# Patient Record
Sex: MALE | Race: OTHER | URBAN - METROPOLITAN AREA
[De-identification: names, ages, dates, MRNs, and addresses within clinical notes are randomized per-mention and may not be internally consistent; named-entity substitution may affect disease eponyms.]

---

## 2017-01-01 ENCOUNTER — EMERGENCY (EMERGENCY)
Facility: HOSPITAL | Age: 27
LOS: 0 days | Discharge: HOME | End: 2017-01-01
Admitting: INTERNAL MEDICINE

## 2017-06-27 DIAGNOSIS — H93.8X1 OTHER SPECIFIED DISORDERS OF RIGHT EAR: ICD-10-CM

## 2017-06-27 DIAGNOSIS — R09.81 NASAL CONGESTION: ICD-10-CM

## 2017-06-27 DIAGNOSIS — J06.9 ACUTE UPPER RESPIRATORY INFECTION, UNSPECIFIED: ICD-10-CM

## 2018-06-08 ENCOUNTER — EMERGENCY (EMERGENCY)
Facility: HOSPITAL | Age: 28
LOS: 0 days | Discharge: HOME | End: 2018-06-08
Attending: EMERGENCY MEDICINE | Admitting: EMERGENCY MEDICINE

## 2018-06-08 VITALS
SYSTOLIC BLOOD PRESSURE: 125 MMHG | OXYGEN SATURATION: 98 % | RESPIRATION RATE: 20 BRPM | DIASTOLIC BLOOD PRESSURE: 86 MMHG | HEART RATE: 116 BPM | TEMPERATURE: 101 F | WEIGHT: 250 LBS | HEIGHT: 69 IN

## 2018-06-08 VITALS
SYSTOLIC BLOOD PRESSURE: 106 MMHG | OXYGEN SATURATION: 98 % | TEMPERATURE: 100 F | DIASTOLIC BLOOD PRESSURE: 76 MMHG | RESPIRATION RATE: 18 BRPM | HEART RATE: 102 BPM

## 2018-06-08 DIAGNOSIS — R51 HEADACHE: ICD-10-CM

## 2018-06-08 DIAGNOSIS — B34.9 VIRAL INFECTION, UNSPECIFIED: ICD-10-CM

## 2018-06-08 DIAGNOSIS — M79.1 MYALGIA: ICD-10-CM

## 2018-06-08 DIAGNOSIS — R50.9 FEVER, UNSPECIFIED: ICD-10-CM

## 2018-06-08 LAB
ALBUMIN SERPL ELPH-MCNC: 4.4 G/DL — SIGNIFICANT CHANGE UP (ref 3.5–5.2)
ALP SERPL-CCNC: 52 U/L — SIGNIFICANT CHANGE UP (ref 30–115)
ALT FLD-CCNC: 32 U/L — SIGNIFICANT CHANGE UP (ref 0–41)
ANION GAP SERPL CALC-SCNC: 17 MMOL/L — HIGH (ref 7–14)
APPEARANCE UR: CLEAR — SIGNIFICANT CHANGE UP
AST SERPL-CCNC: 28 U/L — SIGNIFICANT CHANGE UP (ref 0–41)
BASOPHILS # BLD AUTO: 0.01 K/UL — SIGNIFICANT CHANGE UP (ref 0–0.2)
BASOPHILS NFR BLD AUTO: 0.2 % — SIGNIFICANT CHANGE UP (ref 0–1)
BILIRUB SERPL-MCNC: 0.5 MG/DL — SIGNIFICANT CHANGE UP (ref 0.2–1.2)
BILIRUB UR-MCNC: NEGATIVE — SIGNIFICANT CHANGE UP
BUN SERPL-MCNC: 11 MG/DL — SIGNIFICANT CHANGE UP (ref 10–20)
CALCIUM SERPL-MCNC: 9.1 MG/DL — SIGNIFICANT CHANGE UP (ref 8.5–10.1)
CHLORIDE SERPL-SCNC: 96 MMOL/L — LOW (ref 98–110)
CO2 SERPL-SCNC: 24 MMOL/L — SIGNIFICANT CHANGE UP (ref 17–32)
COLOR SPEC: YELLOW — SIGNIFICANT CHANGE UP
CREAT SERPL-MCNC: 0.7 MG/DL — SIGNIFICANT CHANGE UP (ref 0.7–1.5)
DIFF PNL FLD: NEGATIVE — SIGNIFICANT CHANGE UP
EOSINOPHIL # BLD AUTO: 0.01 K/UL — SIGNIFICANT CHANGE UP (ref 0–0.7)
EOSINOPHIL NFR BLD AUTO: 0.2 % — SIGNIFICANT CHANGE UP (ref 0–8)
GLUCOSE SERPL-MCNC: 102 MG/DL — HIGH (ref 70–99)
GLUCOSE UR QL: NEGATIVE MG/DL — SIGNIFICANT CHANGE UP
HCT VFR BLD CALC: 47.1 % — SIGNIFICANT CHANGE UP (ref 42–52)
HGB BLD-MCNC: 15.8 G/DL — SIGNIFICANT CHANGE UP (ref 14–18)
IMM GRANULOCYTES NFR BLD AUTO: 0.4 % — HIGH (ref 0.1–0.3)
KETONES UR-MCNC: NEGATIVE — SIGNIFICANT CHANGE UP
LEUKOCYTE ESTERASE UR-ACNC: NEGATIVE — SIGNIFICANT CHANGE UP
LYMPHOCYTES # BLD AUTO: 0.87 K/UL — LOW (ref 1.2–3.4)
LYMPHOCYTES # BLD AUTO: 15.6 % — LOW (ref 20.5–51.1)
MCHC RBC-ENTMCNC: 28.4 PG — SIGNIFICANT CHANGE UP (ref 27–31)
MCHC RBC-ENTMCNC: 33.5 G/DL — SIGNIFICANT CHANGE UP (ref 32–37)
MCV RBC AUTO: 84.6 FL — SIGNIFICANT CHANGE UP (ref 80–94)
MONOCYTES # BLD AUTO: 0.46 K/UL — SIGNIFICANT CHANGE UP (ref 0.1–0.6)
MONOCYTES NFR BLD AUTO: 8.3 % — SIGNIFICANT CHANGE UP (ref 1.7–9.3)
NEUTROPHILS # BLD AUTO: 4.2 K/UL — SIGNIFICANT CHANGE UP (ref 1.4–6.5)
NEUTROPHILS NFR BLD AUTO: 75.3 % — HIGH (ref 42.2–75.2)
NITRITE UR-MCNC: NEGATIVE — SIGNIFICANT CHANGE UP
PH UR: 6.5 — SIGNIFICANT CHANGE UP (ref 5–8)
PLATELET # BLD AUTO: 260 K/UL — SIGNIFICANT CHANGE UP (ref 130–400)
POTASSIUM SERPL-MCNC: 4.3 MMOL/L — SIGNIFICANT CHANGE UP (ref 3.5–5)
POTASSIUM SERPL-SCNC: 4.3 MMOL/L — SIGNIFICANT CHANGE UP (ref 3.5–5)
PROT SERPL-MCNC: 7.4 G/DL — SIGNIFICANT CHANGE UP (ref 6–8)
PROT UR-MCNC: (no result) MG/DL
RBC # BLD: 5.57 M/UL — SIGNIFICANT CHANGE UP (ref 4.7–6.1)
RBC # FLD: 12.1 % — SIGNIFICANT CHANGE UP (ref 11.5–14.5)
SODIUM SERPL-SCNC: 137 MMOL/L — SIGNIFICANT CHANGE UP (ref 135–146)
SP GR SPEC: 1.02 — SIGNIFICANT CHANGE UP (ref 1.01–1.03)
UROBILINOGEN FLD QL: 0.2 MG/DL — SIGNIFICANT CHANGE UP (ref 0.2–0.2)
WBC # BLD: 5.57 K/UL — SIGNIFICANT CHANGE UP (ref 4.8–10.8)
WBC # FLD AUTO: 5.57 K/UL — SIGNIFICANT CHANGE UP (ref 4.8–10.8)

## 2018-06-08 RX ORDER — KETOROLAC TROMETHAMINE 30 MG/ML
15 SYRINGE (ML) INJECTION ONCE
Qty: 0 | Refills: 0 | Status: COMPLETED | OUTPATIENT
Start: 2018-06-08 | End: 2018-06-08

## 2018-06-08 RX ORDER — ACETAMINOPHEN 500 MG
650 TABLET ORAL ONCE
Qty: 0 | Refills: 0 | Status: COMPLETED | OUTPATIENT
Start: 2018-06-08 | End: 2018-06-08

## 2018-06-08 RX ORDER — SODIUM CHLORIDE 9 MG/ML
2000 INJECTION, SOLUTION INTRAVENOUS ONCE
Qty: 0 | Refills: 0 | Status: DISCONTINUED | OUTPATIENT
Start: 2018-06-08 | End: 2018-06-08

## 2018-06-08 RX ADMIN — Medication 650 MILLIGRAM(S): at 00:30

## 2018-06-08 NOTE — ED PROVIDER NOTE - CONDITION AT DISCHARGE:
GASTROINTESTINAL - ADULT    • Minimal or absence of nausea and vomiting Progressing    • Maintains or returns to baseline bowel function Progressing    • Maintains adequate nutritional intake (undernourished) Progressing        Impaired Activities of Daily Improved

## 2018-06-08 NOTE — ED PROVIDER NOTE - OBJECTIVE STATEMENT
27 yr old male, no sig PMH, presenting with fevers, headache, sore throat, N/V x1, and myalgias that started today, denies any sick contacts or recent travel, denies any chest pain, SOB, diarrhea, abd pain, or joint pain.

## 2018-06-08 NOTE — ED PROVIDER NOTE - NS ED ROS FT
Review of Systems:  	•	CONSTITUTIONAL - + fever, no diaphoresis, no chills  	•	SKIN - no rash  	•	RESPIRATORY - no shortness of breath, no cough  	•	CARDIAC - no chest pain, no palpitations  	•	GI - no abd pain, no nausea, + vomiting, no diarrhea  	•	MUSCULOSKELETAL - +myalgia, no joint paint, no swelling, no redness  	•	NEUROLOGIC - no weakness, +headache, no paresthesias, no LOC

## 2018-06-08 NOTE — ED PROVIDER NOTE - PHYSICAL EXAMINATION
Alert, NAD, WDWN, well-appearing  PERRL, EOMI, normal pupils, no icterus, normal external ENT, pink/moist membranes, no tonsillar exudates, TMs clear  Airway intact, Lungs CTAB, no wheezing or rhonchi, normal resp effort w/o tachypnea, no retractions, speaking full sentences  CVS1S2, RRR, no m/g/r, 2+ pulses b/l, warm/well-perfused  Abdomen soft, nondistended, no tenderness on palpation to all 4 quadrants, no rebound or rigidity  FROM all 4 ext, no bony tenderness or obvious deformities  Skin warm/dry, no acute rash

## 2018-06-08 NOTE — ED PROVIDER NOTE - ATTENDING CONTRIBUTION TO CARE
pt co fever, myalgias, n, v,x1, cp and sore throat after the vomiting.  no sob. no ab pain. pt in nad, ent nml, necks up, no lad, ctab, rrr, ab soft, nt ,nd. will check labs, cxr.

## 2018-06-09 LAB
CULTURE RESULTS: SIGNIFICANT CHANGE UP
SPECIMEN SOURCE: SIGNIFICANT CHANGE UP

## 2018-10-23 PROBLEM — Z00.00 ENCOUNTER FOR PREVENTIVE HEALTH EXAMINATION: Status: ACTIVE | Noted: 2018-10-23

## 2018-10-26 ENCOUNTER — APPOINTMENT (OUTPATIENT)
Dept: OTOLARYNGOLOGY | Facility: CLINIC | Age: 28
End: 2018-10-26

## 2020-08-10 ENCOUNTER — EMERGENCY (EMERGENCY)
Facility: HOSPITAL | Age: 30
LOS: 0 days | Discharge: HOME | End: 2020-08-10
Attending: EMERGENCY MEDICINE | Admitting: EMERGENCY MEDICINE
Payer: MEDICAID

## 2020-08-10 VITALS
WEIGHT: 199.96 LBS | RESPIRATION RATE: 18 BRPM | DIASTOLIC BLOOD PRESSURE: 82 MMHG | HEIGHT: 69 IN | SYSTOLIC BLOOD PRESSURE: 120 MMHG | OXYGEN SATURATION: 99 % | TEMPERATURE: 98 F | HEART RATE: 80 BPM
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
## 2020-08-10 DIAGNOSIS — S61.411A LACERATION WITHOUT FOREIGN BODY OF RIGHT HAND, INITIAL ENCOUNTER: ICD-10-CM

## 2020-08-10 DIAGNOSIS — Z23 ENCOUNTER FOR IMMUNIZATION: ICD-10-CM
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
## 2020-08-10 PROCEDURE — 12002 RPR S/N/AX/GEN/TRNK2.6-7.5CM: CPT

## 2020-08-10 PROCEDURE — 73130 X-RAY EXAM OF HAND: CPT | Mod: 26,50

## 2020-08-10 PROCEDURE — 99284 EMERGENCY DEPT VISIT MOD MDM: CPT | Mod: 25

## 2020-08-10 RX ORDER — TETANUS TOXOID, REDUCED DIPHTHERIA TOXOID AND ACELLULAR PERTUSSIS VACCINE, ADSORBED 5; 2.5; 8; 8; 2.5 [IU]/.5ML; [IU]/.5ML; UG/.5ML; UG/.5ML; UG/.5ML
0.5 SUSPENSION INTRAMUSCULAR ONCE
Refills: 0 | Status: COMPLETED | OUTPATIENT
Start: 2020-08-10 | End: 2020-08-10

## 2020-08-10 RX ADMIN — TETANUS TOXOID, REDUCED DIPHTHERIA TOXOID AND ACELLULAR PERTUSSIS VACCINE, ADSORBED 0.5 MILLILITER(S): 5; 2.5; 8; 8; 2.5 SUSPENSION INTRAMUSCULAR at 14:16

## 2020-08-10 NOTE — ED PROVIDER NOTE - NS ED ROS FT
Constitutional:  see HPI  Head:  no headache, dizziness, loss of consciousness  Eyes:  no visual changes; no eye pain, redness, or discharge  ENMT:  no ear pain or discharge; no hearing problems; no mouth or throat sores or lesions; no throat pain  Cardiac: no chest pain, tachycardia or palpitations  Respiratory: no cough, wheezing, shortness of breath, chest tightness, or trouble breathing  GI: no nausea, vomiting, diarrhea or abdominal pain  :  no dysuria, frequency, or burning with urination; no change in urine output  MS: no myalgias, muscle weakness, joint pain,or  injury; no joint swelling  Neuro: no weakness; no numbness or tingling; no seizure  Skin:  laceration L hand

## 2020-08-10 NOTE — ED PROVIDER NOTE - NSFOLLOWUPINSTRUCTIONS_ED_ALL_ED_FT
Please return to ED or PCP to remove sutures in 5-7 days.     Laceration    A laceration is a cut that goes through all of the layers of the skin and into the tissue that is right under the skin. Some lacerations heal on their own. Others need to be closed with stitches (sutures), staples, skin adhesive strips, or skin glue. Proper laceration care minimizes the risk of infection and helps the laceration to heal better.     SEEK IMMEDIATE MEDICAL CARE IF YOU HAVE THE FOLLOWING SYMPTOMS: swelling around the wound, worsening pain, drainage from the wound, red streaking going away from your wound, inability to move finger or toe near the laceration, or discoloration of skin near the laceration.

## 2020-08-10 NOTE — ED PROVIDER NOTE - ATTENDING CONTRIBUTION TO CARE
28yo M with no sig PMHx presents for right hand lacerations. Garbage can fell on hand. Swelling to right 3rd MCP joint with ecchymosis and laceration to right dorsum of 2nd MCP joint in L shape. Tdap unknown.

## 2020-08-10 NOTE — ED PROVIDER NOTE - PATIENT PORTAL LINK FT
You can access the FollowMyHealth Patient Portal offered by [facility] by registering at the following website: [email or web address]. By joining [employer]’s FollowMyHealth portal, you will also be able to view your health information using other applications (apps) compatible with our system.

## 2020-08-10 NOTE — ED PROVIDER NOTE - OBJECTIVE STATEMENT
28 yo male presenting with L hand laceration after garbage call fell on top of his hand, reporting sudden onset sharp pain to L index and third finger, worse with movement. reports pain to L 3rd finger and has 3 cm  laceration on dorsum of L hand.  denies weakness, numbness, tingling in extremities, fever. does not remember last tetanus. 30 yo male presenting with R hand laceration after garbage call fell on top of his hand, reporting sudden onset sharp pain to R index and third finger, worse with movement. reports pain to R 3rd finger and has 3 cm  laceration on dorsum of R hand and 1 cm laceration on R palmar side.  denies weakness, numbness, tingling in extremities, fever. does not remember last tetanus.

## 2020-08-10 NOTE — ED PROVIDER NOTE - CLINICAL SUMMARY MEDICAL DECISION MAKING FREE TEXT BOX
Hand/finger injury. Laceration repaired. Xray negative, likely sprain or soft tissue injury. Aluminum/ciaran splint applied to injured fingers. F/u for suture removal. Return precautions given.

## 2020-08-10 NOTE — ED PROVIDER NOTE - PHYSICAL EXAMINATION
CONSTITUTIONAL: Well-developed; well-nourished; in no acute distress.   SKIN: 3cm L shaped laceration dorsum proximal to L index finger, superficial laceration on R thumb  HEAD: Normocephalic; atraumatic.  EYES: PERRL, EOMI, normal sclera and conjunctiva   ENT: No nasal discharge; airway clear.  NECK: Supple; non tender.  CARD: S1, S2 normal; no murmurs, gallops, or rubs. Regular rate and rhythm.   RESP: No wheezes, rales or rhonchi.  ABD: soft ntnd  EXT: Normal ROM.  sensation and strength intact b/l extremities.   LYMPH: No acute cervical adenopathy.  NEURO: Alert, oriented, grossly unremarkable  PSYCH: Cooperative, appropriate. CONSTITUTIONAL: Well-developed; well-nourished; in no acute distress.   SKIN: 3cm L shaped laceration dorsum proximal to R index finger, 1 cm laceration on R palmar side, superficial laceration on L thumb  HEAD: Normocephalic; atraumatic.  EYES: PERRL, EOMI, normal sclera and conjunctiva   ENT: No nasal discharge; airway clear.  NECK: Supple; non tender.  CARD: S1, S2 normal; no murmurs, gallops, or rubs. Regular rate and rhythm.   RESP: No wheezes, rales or rhonchi.  ABD: soft ntnd  EXT: Normal ROM.  sensation and strength intact b/l extremities.   LYMPH: No acute cervical adenopathy.  NEURO: Alert, oriented, grossly unremarkable  PSYCH: Cooperative, appropriate. CONSTITUTIONAL: Well-developed; well-nourished; in no acute distress.   SKIN: 3cm L shaped laceration dorsum proximal to R index finger, 1 cm laceration on R palmar side, superficial laceration on L thumb  CARD: S1, S2 normal; no murmurs, gallops, or rubs. Regular rate and rhythm.   RESP: No wheezes, rales or rhonchi.  EXT: Normal ROM.  sensation and strength intact b/l extremities.

## 2020-08-19 ENCOUNTER — EMERGENCY (EMERGENCY)
Facility: HOSPITAL | Age: 30
LOS: 0 days | Discharge: HOME | End: 2020-08-19
Attending: EMERGENCY MEDICINE | Admitting: EMERGENCY MEDICINE
Payer: MEDICAID

## 2020-08-19 VITALS
HEIGHT: 69 IN | TEMPERATURE: 97 F | WEIGHT: 199.96 LBS | DIASTOLIC BLOOD PRESSURE: 80 MMHG | OXYGEN SATURATION: 99 % | SYSTOLIC BLOOD PRESSURE: 117 MMHG | HEART RATE: 78 BPM | RESPIRATION RATE: 20 BRPM

## 2020-08-19 DIAGNOSIS — Z48.02 ENCOUNTER FOR REMOVAL OF SUTURES: ICD-10-CM

## 2020-08-19 PROCEDURE — L9995: CPT

## 2020-08-19 NOTE — ED PROVIDER NOTE - NS ED ROS FT
Constitutional: (-) weakness  Musculoskeletal: (-) joint pain,  Integumentary: (-) bleeding, (+) suture removal  Neurological: (-) tingling, (-)numbness,  Allergic/Immunologic: (-) pruritus

## 2020-08-19 NOTE — ED PROVIDER NOTE - OBJECTIVE STATEMENT
28 yo male, no pmh, presents to ed for suture removal. sutures to right hand, no dc, fever, limited rom, or pain. Placed 14 days ago. 9 sutures placed at that time.

## 2020-08-19 NOTE — ED ADULT NURSE NOTE - NSIMPLEMENTINTERV_GEN_ALL_ED
Implemented All Universal Safety Interventions:  [place] to call system. Call bell, personal items and telephone within reach. Instruct patient to call for assistance. Room bathroom lighting operational. Non-slip footwear when patient is off stretcher. Physically safe environment: no spills, clutter or unnecessary equipment. Stretcher in lowest position, wheels locked, appropriate side rails in place.

## 2020-08-19 NOTE — ED PROVIDER NOTE - PATIENT PORTAL LINK FT
You can access the FollowMyHealth Patient Portal offered by [facility] by registering at the following website: [email or web address]. By joining [employer]’s FollowMyHealth portal, you will also be able to view your health information using other applications (apps) compatible with our system.

## 2020-08-19 NOTE — ED PROVIDER NOTE - PHYSICAL EXAMINATION
Physical Exam    Vital Signs: I have reviewed the initial vital signs.  Constitutional: well-nourished, appears stated age, no acute distress  Eyes: Conjunctiva pink, Sclera clear  Cardiovascular: S1 and S2, regular rate, regular rhythm, well-perfused extremities, radial pulses equal and 2+  Respiratory: unlabored respiratory effort, clear to auscultation bilaterally no wheezing, rales and rhonchi  Musculoskeletal: supple neck, no lower extremity edema, no midline tenderness, from of left hand.   Integumentary: 9 sutures in tact to left hand, no dc no erythema   Neurologic: awake, alert, nvi

## 2020-08-19 NOTE — ED PROCEDURE NOTE - CPROC ED SUTURE APPEARANCE1
Subjective:      Conor Desir is a 14 y.o. male here with mother. Patient brought in for Asthma      History of Present Illness:  This 14 year old is here with levy asthma exacerbation.  He got 4 albuterol nebs overnight.  He had enough distress that his mother called EMS.  His vital signs were stable and he was not taken to the ER.  HIs mother reports that he has improved somewhat overnight, but that he is still wheezing.  No fever.        Review of Systems   Constitutional: Negative for activity change, appetite change and fever.   HENT: Positive for congestion. Negative for rhinorrhea.    Eyes: Negative for discharge.   Respiratory: Positive for cough, chest tightness, shortness of breath and wheezing.    Gastrointestinal: Negative for diarrhea and vomiting.   Genitourinary: Negative for decreased urine volume.   Skin: Negative for rash.   Neurological: Negative for headaches.       Objective:     Physical Exam   Constitutional: He is oriented to person, place, and time. He appears well-developed and well-nourished. No distress.   HENT:   Right Ear: External ear normal.   Left Ear: External ear normal.   Mouth/Throat: Oropharynx is clear and moist.   TMs clear bilaterally   Eyes: Conjunctivae are normal. Pupils are equal, round, and reactive to light.   Cardiovascular: Normal rate, regular rhythm and normal heart sounds.    No murmur heard.  Pulmonary/Chest: Effort normal. No respiratory distress. He has wheezes (faint, bilateral).   Abdominal: Soft. Bowel sounds are normal. He exhibits no mass. There is no tenderness.   Musculoskeletal: He exhibits no edema.   Lymphadenopathy:     He has no cervical adenopathy.   Neurological: He is alert and oriented to person, place, and time.   Skin: Skin is warm. No rash noted.   Psychiatric: He has a normal mood and affect. His behavior is normal.       Assessment:        1. Asthma, acute         Plan:         Problem List Items Addressed This Visit     None      Visit  Diagnoses     Asthma, acute    -  Primary    Relevant Medications    inhalation spacing device        Prednisone x 5 days  Albuterol as needed  Symptomatic measures  Call with any new or worsening problems  Follow up as needed          clean

## 2020-08-19 NOTE — ED PROVIDER NOTE - ATTENDING CONTRIBUTION TO CARE
28 yo M presents for suture removal.  Sutures placed in ED 9 days ago.  On exam pt in NAD AAO x 3, + sutures in tact to right hand , no signs of infection, sensation intact, good ROM,

## 2020-08-20 PROBLEM — Z78.9 OTHER SPECIFIED HEALTH STATUS: Chronic | Status: ACTIVE | Noted: 2020-08-10

## 2020-08-21 ENCOUNTER — EMERGENCY (EMERGENCY)
Facility: HOSPITAL | Age: 30
LOS: 0 days | Discharge: HOME | End: 2020-08-21
Attending: EMERGENCY MEDICINE | Admitting: EMERGENCY MEDICINE
Payer: MEDICAID

## 2020-08-21 VITALS
HEIGHT: 69 IN | RESPIRATION RATE: 18 BRPM | TEMPERATURE: 98 F | DIASTOLIC BLOOD PRESSURE: 85 MMHG | SYSTOLIC BLOOD PRESSURE: 143 MMHG | OXYGEN SATURATION: 99 % | HEART RATE: 87 BPM

## 2020-08-21 DIAGNOSIS — S61.401A UNSPECIFIED OPEN WOUND OF RIGHT HAND, INITIAL ENCOUNTER: ICD-10-CM
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
## 2020-08-21 DIAGNOSIS — T81.33XA DISRUPTION OF TRAUMATIC INJURY WOUND REPAIR, INITIAL ENCOUNTER: ICD-10-CM
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
## 2020-08-21 PROCEDURE — 99283 EMERGENCY DEPT VISIT LOW MDM: CPT

## 2020-08-21 RX ORDER — CEPHALEXIN 500 MG
1 CAPSULE ORAL
Qty: 20 | Refills: 0
Start: 2020-08-21 | End: 2020-08-25

## 2020-08-21 NOTE — ED PROVIDER NOTE - PROGRESS NOTE DETAILS
wound cleaned bacitracin applied and patient given more bacitracin for home, wound loosely closed with steri strip and alumin splint applied ATTENDING NOTE: Mild wound infection with some dehiscence. PO ABX ordered.

## 2020-08-21 NOTE — ED PROVIDER NOTE - OBJECTIVE STATEMENT
Patient is a 29 years old M presents to the ED for evaluation of wound to right hand.  As per patient laceration on 8/10 sutures placed and removed on 8/19.  Tetanus up to date. Pt here because he noted a small drop of liquid coming from the wound. No fever no chills, minimal redness noted to the area. Patient is a 29 years old M presents to the ED for evaluation of wound to right hand.  As per patient laceration on 8/10 sutures placed and removed on 8/19.  Tetanus up to date. Right hand dominant.  Pt here because he noted a small drop of liquid coming from the wound. No fever no chills, minimal redness noted to the area.

## 2020-08-21 NOTE — ED PROVIDER NOTE - PHYSICAL EXAMINATION
Gen: Alert, NAD, well appearing  Ext: No tenderness on examination of right hand except for minimal tenderness at area of wound, FROM to digit with no pain with ROM no pain to fat pad of distal digit, cap refill less than 2 sec  Skin: minimal surrounding erythema to skin wound with small amount of yellow pus expressed from area, no   Neuro: AAOx3

## 2020-08-21 NOTE — ED PROVIDER NOTE - PATIENT PORTAL LINK FT
You can access the FollowMyHealth Patient Portal offered by [facility] by registering at the following website: [email or web address]. By joining [employer]’s FollowMyHealth portal, you will also be able to view your health information using other applications (apps) compatible with our system.

## 2020-08-21 NOTE — ED PROVIDER NOTE - NS ED ROS FT
Review of Systems    Constitutional: (-) fever  Musculoskeletal: (-) joint pain  Integumentary: (-) rash  Neurological: (-) headache

## 2020-08-21 NOTE — ED ADULT NURSE NOTE - NSIMPLEMENTINTERV_GEN_ALL_ED
JOSIE CAI LEnio DISCUSSED ACTIVITY LEVEL. LABS / TDAP.   Implemented All Universal Safety Interventions:  [place] to call system. Call bell, personal items and telephone within reach. Instruct patient to call for assistance. Room bathroom lighting operational. Non-slip footwear when patient is off stretcher. Physically safe environment: no spills, clutter or unnecessary equipment. Stretcher in lowest position, wheels locked, appropriate side rails in place.

## 2021-07-22 ENCOUNTER — EMERGENCY (EMERGENCY)
Facility: HOSPITAL | Age: 31
LOS: 0 days | Discharge: HOME | End: 2021-07-22
Attending: EMERGENCY MEDICINE | Admitting: EMERGENCY MEDICINE
Payer: MEDICAID

## 2021-07-22 VITALS
HEIGHT: 69 IN | SYSTOLIC BLOOD PRESSURE: 137 MMHG | HEART RATE: 100 BPM | DIASTOLIC BLOOD PRESSURE: 78 MMHG | OXYGEN SATURATION: 99 % | TEMPERATURE: 98 F | RESPIRATION RATE: 18 BRPM

## 2021-07-22 VITALS — WEIGHT: 210.1 LBS

## 2021-07-22 DIAGNOSIS — Z87.891 PERSONAL HISTORY OF NICOTINE DEPENDENCE: ICD-10-CM

## 2021-07-22 DIAGNOSIS — K02.9 DENTAL CARIES, UNSPECIFIED: ICD-10-CM

## 2021-07-22 PROCEDURE — 99282 EMERGENCY DEPT VISIT SF MDM: CPT

## 2021-07-22 NOTE — ED PROVIDER NOTE - OBJECTIVE STATEMENT
29 y/o M, no significant PMHx, presents to the ED with complaints of dentalgia x three days. He admits to left lower dental pain and believes that his wisdom tooth has been causing him this pain. He went to an [facility] yesterday and was prescribed Ibuprofen and Amoxicillin which he has been taking. He has not yet been to a dentist.

## 2021-07-22 NOTE — ED PROVIDER NOTE - ATTENDING CONTRIBUTION TO CARE
29yo man c/o dental pain L lower jaw x 3 weeks, worsening. VS, exam as noted, pain at#17 without gingival swelling.

## 2021-07-22 NOTE — ED PROVIDER NOTE - ENMT, MLM
Airway patent, Nasal mucosa clear. Mouth with normal mucosa. Throat has no vesicles, no oropharyngeal exudates and uvula is midline.  + tenderness along tooth # 17 without surrounding gum fluctuance

## 2021-07-22 NOTE — ED ADULT NURSE NOTE - OBJECTIVE STATEMENT
pt presents with left lower dental pain started x 3 days. pt was seen in urgent care, give Motrin and Amoxicillin . pt states motrin helps but doesn't completely resolve pain

## 2021-07-22 NOTE — ED PROVIDER NOTE - ENMT NEGATIVE STATEMENT, MLM
+ dental pain; Ears: no ear pain and no hearing problems. Nose: no nasal congestion and no nasal drainage. Mouth/Throat: no dysphagia, no hoarseness and no throat pain. Neck: no lumps, no pain, no stiffness and no swollen glands.

## 2022-08-17 NOTE — ED ADULT TRIAGE NOTE - TEMPERATURE IN CELSIUS (DEGREES C)
81-year-old male with a history of atrial fibrillation on Coumadin, diabetes, congestive heart failure, hypertension, hyperlipidemia, peripheral neuropathy presents with a chief complaint of left hip pain. Patient tripped and fell this morning landing on his left hip. He rates his pain at 2 out of 10 currently. The pain is worse with movement of the left hip. He denies hitting his head or loss of consciousness. He denies chest pain, abdominal pain. He does endorse shortness of breath but states he has a history of congestive heart failure and is due for Bumex today as he has gained approximately 3 pounds in last several days. Past Medical History:   Diagnosis Date    Atrial fibrillation ([facility]     CHF (congestive heart failure) ([facility]     Diabetes ([facility]     Glaucoma     Hyperlipidemia     Hypertension     Osteoarthritis     Peripheral neuropathy     Thrombocytasthenia ([facility]        Past Surgical History:   Procedure Laterality Date    HX APPENDECTOMY      HX LUMBAR LAMINECTOMY      HX PACEMAKER PLACEMENT      HX TONSIL AND ADENOIDECTOMY           No family history on file.     Social History     Socioeconomic History    Marital status:      Spouse name: Not on file    Number of children: Not on file    Years of education: Not on file    Highest education level: Not on file   Occupational History    Not on file   Tobacco Use    Smoking status: Never    Smokeless tobacco: Never   Substance and Sexual Activity    Alcohol use: Yes    Drug use: Not on file    Sexual activity: Not on file   Other Topics Concern    Not on file   Social History Narrative    Not on file     Social Determinants of Health     Financial Resource Strain: Not on file   Food Insecurity: Not on file   Transportation Needs: Not on file   Physical Activity: Not on file   Stress: Not on file   Social Connections: Not on file   Intimate Partner Violence: Not on file   Housing Stability: Not on file         ALLERGIES: Patient has no known allergies. Review of Systems   Constitutional:  Negative for fever. HENT:  Negative for rhinorrhea. Respiratory:  Positive for shortness of breath. Negative for cough. Cardiovascular:  Negative for chest pain. Gastrointestinal:  Negative for abdominal pain. Genitourinary:  Negative for dysuria. Musculoskeletal:  Positive for arthralgias. Negative for back pain. Skin:  Negative for wound. Neurological:  Negative for headaches. Psychiatric/Behavioral:  Negative for confusion. Vitals:    08/17/22 1038 08/17/22 1042   BP: (!) 139/110    Pulse: 81    Resp: 23    Temp: 97.8 °F (36.6 °C)    SpO2: 99%    Weight:  83.5 kg (184 lb)   Height:  6' 1\" (1.854 m)            Physical Exam  Vitals and nursing note reviewed. Constitutional:       General: He is not in acute distress. Appearance: Normal appearance. He is well-developed. He is not ill-appearing, toxic-appearing or diaphoretic. HENT:      Head: Normocephalic. Eyes:      Extraocular Movements: Extraocular movements intact. Cardiovascular:      Rate and Rhythm: Normal rate. Pulses: Normal pulses. Comments: Strong left pedal pulse. Pulmonary:      Effort: Pulmonary effort is normal. No respiratory distress. Breath sounds: Examination of the right-lower field reveals rales. Examination of the left-lower field reveals rales. Rales present. Abdominal:      General: There is no distension. Musculoskeletal:         General: Normal range of motion. Cervical back: Normal range of motion. Comments: Pain with flexion extension of the left hip. Skin:     General: Skin is dry. Capillary Refill: Capillary refill takes less than 2 seconds. Neurological:      Mental Status: He is alert and oriented to person, place, and time. Comments: Patient is able to wiggle the toes of the left foot.   He has baseline sensation   Psychiatric:         Mood and Affect: Mood normal.        MDM  Number of Diagnoses or Management Options  Acute congestive heart failure, unspecified heart failure type ([address]  Closed displaced fracture of left femoral neck (HCC)  Diagnosis management comments:   81year-old male presents with left hip pain after a fall. Plain film and CT imaging obtained to rule out traumatic injury. Left hip x-ray demonstrates left femoral neck fracture. CT head unremarkable. Patient also complains of shortness of breath. His BNP is elevated. We will treat with Bumex and admit to medicine. Orthopedic surgery on consult. Patient comfortable and agreeable with plan of care. Perfect Serve Consult  12:02 PM    ED Room Number: ER25/25  Patient Name and age:  Amaury Caicedo 81 y.o.  male  Working Diagnosis: Closed displaced fracture of left femoral neck ([address]  (primary encounter diagnosis)  Acute congestive heart failure, unspecified heart failure type ([address]    Ground-level fall onto left hip. Neurovascularly intact. Patient is on Coumadin for history of atrial fibrillation. He also appears to be in an acute CHF exacerbation. 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 Perfect [facility] Consult for Admission  12:07 PM    ED Room Number: ER25/25  Patient Name and age:  Amaury Caicedo 81 y.o.  male  Working Diagnosis: Closed displaced fracture of left femoral neck ([address]  (primary encounter diagnosis)  Acute congestive heart failure, unspecified heart failure type ([address]    COVID-19 Suspicion:  no  Sepsis present:  no  Reassessment needed: no  Code Status:  Full Code  Readmission: no  Isolation Requirements:  no  Recommended Level of Care:  telemetry  Department:[facility] Adult ED - 21   Other:  ortho on consult, bumex ordered.  CT head pending             Amount and/or Complexity of Data Reviewed  Clinical lab tests: ordered and reviewed  Tests in the radiology section of CPT®: reviewed and ordered      ED Course as of 08/17/22 1730   Wed Aug 17, 2022   1052 EKG shows paced rhythm at a rate of 78, prolonged QTC, left axis, no ischemic changes [RD]      ED Course User Index  [RD] Bull Gonzalez MD       Procedures 36.7

## 2023-07-05 ENCOUNTER — APPOINTMENT (OUTPATIENT)
Dept: OTOLARYNGOLOGY | Facility: CLINIC | Age: 33
End: 2023-07-05
Payer: MEDICAID

## 2023-07-05 VITALS — HEIGHT: 69 IN | BODY MASS INDEX: 34.07 KG/M2 | WEIGHT: 230 LBS

## 2023-07-05 DIAGNOSIS — K12.30 ORAL MUCOSITIS (ULCERATIVE), UNSPECIFIED: ICD-10-CM

## 2023-07-05 PROCEDURE — 99203 OFFICE O/P NEW LOW 30 MIN: CPT

## 2023-07-05 NOTE — HISTORY OF PRESENT ILLNESS
[de-identified] : Patient presents today c/o abrasions on tongue. States that for the past 6 months he noticed spots of redness on his tongue. Does not feel pain or discomfort. No difficulty swallowing or eating. Denies dryness. Denies altered taste.

## 2023-10-30 NOTE — ED PROVIDER NOTE - CONDITION AT DISCHARGE:
"Chief Complaint:   Follow-up, Hypertension, Hyperlipidemia, Syncope, and Coronary Artery Disease     History Of Present Illness:    Edu Domingo is a 71 y.o. female presenting with followup.    She denies any arrhythmia symptoms.  She denies any lightheadedness, near syncope, or syncope.     She requests refills.    Last Recorded Vitals:  Vitals:    10/30/23 1343   BP: 130/72   BP Location: Right arm   Patient Position: Sitting   Pulse: (!) 119   Weight: 60.8 kg (134 lb)   Height: 1.651 m (5' 5\")       Past Medical History:  She has a past medical history of Anxiety disorder, unspecified, Calculus of kidney, Major depressive disorder, recurrent, in partial remission (CMS/HCC) (09/21/2021), Old myocardial infarction, Other conditions influencing health status, Other transient cerebral ischemic attacks and related syndromes, Personal history of other diseases of the circulatory system, Personal history of other malignant neoplasm of bronchus and lung, and Personal history of other specified conditions (01/10/2019).    Past Surgical History:  She has a past surgical history that includes Other surgical history (08/20/2013); Other surgical history (08/20/2013); Lung lobectomy (04/22/2014); Other surgical history (04/22/2014); CT angio head w and wo IV contrast (11/26/2021); CT angio neck w and wo IV contrast (11/26/2021); and CT angio neck w and wo IV contrast (11/23/2022).      Social History:  She reports that she has quit smoking. Her smoking use included cigarettes. She has never used smokeless tobacco. She reports that she does not currently use alcohol. She reports that she does not currently use drugs.    Family History:  Family History   Problem Relation Name Age of Onset    Heart disease Mother      Rectal cancer Mother      Other (cardiac disorder) Mother      Prostate cancer Father      Ovarian cancer Mother's Sister      Breast cancer Mother's Sister      Diabetes Maternal Grandmother      Diabetes Maternal " Grandfather      Cancer Cousin          Allergies:  Morphine, Myrbetriq [mirabegron], Oxycodone-acetaminophen, and Sulfa (sulfonamide antibiotics)    Outpatient Medications:  Current Outpatient Medications   Medication Instructions    acetaminophen (TYLENOL ORAL) 1 tablet, oral, Daily PRN    albuterol 90 mcg/actuation inhaler INHALE 2 PUFFS BY MOUTH AS INSTRUCTED EVERY 4 HOURS AS NEEDED FOR WHEEZING / SHORTNESS OF BREATH    ALPRAZolam (XANAX) 0.5 mg, oral, 3 times daily PRN    Anoro Ellipta 62.5-25 mcg/actuation blister with device INHALE 1 PUFF BY MOUTH AS INSTRUCTED ONCE DAILY.    aspirin 81 mg EC tablet 1 tablet, oral, Daily    atorvastatin (Lipitor) 80 mg tablet 1 tablet, oral, Daily    carvedilol (COREG) 25 mg, oral, 2 times daily with meals    celecoxib (CELEBREX) 200 mg, oral, Daily PRN    cetirizine HCl (ZYRTEC ORAL) 1 capsule, oral, Daily PRN    cholecalciferol (Vitamin D-3) 50 MCG (2000 UT) tablet oral    citalopram (CELEXA) 40 mg, oral, Daily    doxycycline (Adoxa) 100 mg tablet 100 Milligram    estradiol (Estrace) 0.01 % (0.1 mg/gram) vaginal cream INSERT 1 GRAM INTRAVAGINALLY 3 NIGHTS A WEEK    fluorouracil (Efudex) 5 % cream 1 Application    furosemide (LASIX) 20 mg, oral, Daily    Gemtesa 75 mg tablet 1 tablet, oral, Daily    hydrocortisone 2.5 % cream 1 Application    lisinopril 20 mg, oral, Daily    magnesium oxide (MAG-OX) 400 mg, oral, Daily    nicotine (Nicoderm CQ) 21 mg/24 hr patch 1 patch, transdermal, Every 24 hours    nitroglycerin (NITROSTAT) 0.4 mg, sublingual, Every 5 min PRN, Times three PRN    NON FORMULARY Vitamin d oral    omeprazole (PRILOSEC) 40 mg, oral, Daily before breakfast    oxygen (O2) gas therapy at bedtime    solifenacin (VESICARE) 10 mg, oral, Daily    venlafaxine XR (EFFEXOR-XR) 37.5 mg, oral, Daily RT, Take with food       Physical Exam:  Physical Exam  Constitutional:       Appearance: Normal appearance.   Cardiovascular:      Rate and Rhythm: Normal rate.      Pulses:  Normal pulses.      Heart sounds: Normal heart sounds.   Pulmonary:      Effort: Pulmonary effort is normal.   Neurological:      General: No focal deficit present.      Mental Status: She is alert.            Last Labs:  CBC -  Lab Results   Component Value Date    WBC 7.2 10/10/2023    HGB 12.4 10/10/2023    HCT 40.0 10/10/2023    MCV 86 10/10/2023     10/10/2023       CMP -  Lab Results   Component Value Date    CALCIUM 9.6 10/17/2023    PHOS 4.2 05/26/2023    PROT 7.3 10/10/2023    ALBUMIN 4.7 10/10/2023    AST 19 10/10/2023    ALT 21 10/10/2023    ALKPHOS 98 10/10/2023    BILITOT 0.5 10/10/2023       LIPID PANEL -   Lab Results   Component Value Date    CHOL 199 09/29/2022    TRIG 166 (H) 09/29/2022    HDL 57.0 09/29/2022    CHHDL 3.5 09/29/2022    LDLF 109 (H) 09/29/2022    VLDL 33 09/29/2022       RENAL FUNCTION PANEL -   Lab Results   Component Value Date    GLUCOSE 88 10/17/2023     10/17/2023    K 4.2 10/17/2023     10/17/2023    CO2 32 10/17/2023    ANIONGAP 11 10/17/2023    BUN 18 10/17/2023    CREATININE 1.27 (H) 10/17/2023    CALCIUM 9.6 10/17/2023    PHOS 4.2 05/26/2023    ALBUMIN 4.7 10/10/2023        Lab Results   Component Value Date    BNP 1,066 (H) 12/05/2021    HGBA1C 5.7 12/22/2020       Review of Systems   Constitutional: Negative.   Cardiovascular: Negative.    Respiratory: Negative.     Neurological: Negative.    All other systems reviewed and are negative.  Last Cardiology Tests:  ECG:    Today.  Normal sinus rhythm.  Right axis deviation.  Corrected QT interval 480 ms  ECG Nov 2022/. NSR. RAD. QTc 470 ms        Ziopatch #2 Jan 2023 . Brief ectopic atrial rhythm or atrial tachycardia. Rare PAC's and PVC's  Ziopatch #1 Jan 2023. Brief atrial tachycardia. rare PAC's and PVC's      Cath:  Cath Dec 2022 mod disease of L Cx, normal LVEF    Lab review: I have personally reviewed the laboratory result(s) see above    Assessment/Plan   Problem List Items Addressed This Visit   "     Abnormal EKG    CAD S/P percutaneous coronary angioplasty    Hyperlipidemia    Near syncope    Palpitations    BMI 22.0-22.9, adult           Ectopic atrial rhythm and atrial tachycardia. Reviewed Ziopatch x 2 with pt. Reviewed normal conduction and tachycardia. Doing well with current medications. Reviewed meds with pt. Refills.    NSVT. Normal LVEF by cath Dec 2022 Reviewed with pt.   CAD by cath Dec. 2022. Reviewed cath. Chronic. Stable. Reviewed meds. Continue meds. Refills.  Rare PAC\"s and PVC's. Reviewed conduction. Continue Mg Ox. Refill.  Prolonged QT. no proarrhythmia on montior. Continue Mg Ox. Refill.  Tobacco cessation. Reviewed smoking cessation.    AHA recommendations for exercise, diet, and behavioral modification reviewed with pt.    The patient and I discussed the mechanism of arrhythmia, PAC, PVC, atrial tachycardia, ectopic atrial rhythm, Mg Ox, empirical use of beta blocker, ECG, indications for and types of medications, discussion if and what medication refills needed, treatment options, risks, benefits, and imponderables. American Heart Association lifestyle changes and behavioral modification discussed. All questions answered in detail. Counseling over 50% visit regarding above. Patient appreciative of care.         Depression screen    Normal depression screening noted and reviewed with patient. Referred to primary physician for follow- up.          Grammar    Please excuse grammatical or dictation errors as software dictation application being used.            Junior Suarez LPN  " Satisfactory

## 2024-06-14 ENCOUNTER — EMERGENCY (EMERGENCY)
Facility: HOSPITAL | Age: 34
LOS: 0 days | Discharge: HOME | End: 2024-06-14
Attending: EMERGENCY MEDICINE
Payer: MEDICAID

## 2024-06-14 VITALS
DIASTOLIC BLOOD PRESSURE: 74 MMHG | RESPIRATION RATE: 18 BRPM | SYSTOLIC BLOOD PRESSURE: 129 MMHG | OXYGEN SATURATION: 99 % | HEART RATE: 64 BPM

## 2024-06-14 VITALS
HEIGHT: 70 IN | HEART RATE: 77 BPM | RESPIRATION RATE: 18 BRPM | DIASTOLIC BLOOD PRESSURE: 78 MMHG | OXYGEN SATURATION: 96 % | WEIGHT: 220.02 LBS | SYSTOLIC BLOOD PRESSURE: 116 MMHG | TEMPERATURE: 99 F

## 2024-06-14 DIAGNOSIS — M54.9 DORSALGIA, UNSPECIFIED: ICD-10-CM

## 2024-06-14 DIAGNOSIS — R10.9 UNSPECIFIED ABDOMINAL PAIN: ICD-10-CM

## 2024-06-14 DIAGNOSIS — M54.50 LOW BACK PAIN, UNSPECIFIED: ICD-10-CM

## 2024-06-14 LAB
ALBUMIN SERPL ELPH-MCNC: 4.6 G/DL — SIGNIFICANT CHANGE UP (ref 3.5–5.2)
ALP SERPL-CCNC: 54 U/L — SIGNIFICANT CHANGE UP (ref 30–115)
ALT FLD-CCNC: 28 U/L — SIGNIFICANT CHANGE UP (ref 0–41)
ANION GAP SERPL CALC-SCNC: 10 MMOL/L — SIGNIFICANT CHANGE UP (ref 7–14)
APPEARANCE UR: CLEAR — SIGNIFICANT CHANGE UP
AST SERPL-CCNC: 25 U/L — SIGNIFICANT CHANGE UP (ref 0–41)
BASOPHILS # BLD AUTO: 0.03 K/UL — SIGNIFICANT CHANGE UP (ref 0–0.2)
BASOPHILS NFR BLD AUTO: 0.5 % — SIGNIFICANT CHANGE UP (ref 0–1)
BILIRUB SERPL-MCNC: 0.2 MG/DL — SIGNIFICANT CHANGE UP (ref 0.2–1.2)
BILIRUB UR-MCNC: NEGATIVE — SIGNIFICANT CHANGE UP
BUN SERPL-MCNC: 14 MG/DL — SIGNIFICANT CHANGE UP (ref 10–20)
CALCIUM SERPL-MCNC: 9.7 MG/DL — SIGNIFICANT CHANGE UP (ref 8.4–10.5)
CHLORIDE SERPL-SCNC: 101 MMOL/L — SIGNIFICANT CHANGE UP (ref 98–110)
CO2 SERPL-SCNC: 28 MMOL/L — SIGNIFICANT CHANGE UP (ref 17–32)
COLOR SPEC: YELLOW — SIGNIFICANT CHANGE UP
CREAT SERPL-MCNC: 0.6 MG/DL — LOW (ref 0.7–1.5)
DIFF PNL FLD: NEGATIVE — SIGNIFICANT CHANGE UP
EGFR: 132 ML/MIN/1.73M2 — SIGNIFICANT CHANGE UP
EOSINOPHIL # BLD AUTO: 0.03 K/UL — SIGNIFICANT CHANGE UP (ref 0–0.7)
EOSINOPHIL NFR BLD AUTO: 0.5 % — SIGNIFICANT CHANGE UP (ref 0–8)
GLUCOSE SERPL-MCNC: 100 MG/DL — HIGH (ref 70–99)
GLUCOSE UR QL: NEGATIVE MG/DL — SIGNIFICANT CHANGE UP
HCT VFR BLD CALC: 46.1 % — SIGNIFICANT CHANGE UP (ref 42–52)
HGB BLD-MCNC: 15.2 G/DL — SIGNIFICANT CHANGE UP (ref 14–18)
IMM GRANULOCYTES NFR BLD AUTO: 0.5 % — HIGH (ref 0.1–0.3)
KETONES UR-MCNC: NEGATIVE MG/DL — SIGNIFICANT CHANGE UP
LACTATE SERPL-SCNC: 1.1 MMOL/L — SIGNIFICANT CHANGE UP (ref 0.7–2)
LEUKOCYTE ESTERASE UR-ACNC: NEGATIVE — SIGNIFICANT CHANGE UP
LIDOCAIN IGE QN: 57 U/L — SIGNIFICANT CHANGE UP (ref 7–60)
LYMPHOCYTES # BLD AUTO: 2.03 K/UL — SIGNIFICANT CHANGE UP (ref 1.2–3.4)
LYMPHOCYTES # BLD AUTO: 35.3 % — SIGNIFICANT CHANGE UP (ref 20.5–51.1)
MCHC RBC-ENTMCNC: 29.3 PG — SIGNIFICANT CHANGE UP (ref 27–31)
MCHC RBC-ENTMCNC: 33 G/DL — SIGNIFICANT CHANGE UP (ref 32–37)
MCV RBC AUTO: 88.8 FL — SIGNIFICANT CHANGE UP (ref 80–94)
MONOCYTES # BLD AUTO: 0.5 K/UL — SIGNIFICANT CHANGE UP (ref 0.1–0.6)
MONOCYTES NFR BLD AUTO: 8.7 % — SIGNIFICANT CHANGE UP (ref 1.7–9.3)
NEUTROPHILS # BLD AUTO: 3.13 K/UL — SIGNIFICANT CHANGE UP (ref 1.4–6.5)
NEUTROPHILS NFR BLD AUTO: 54.5 % — SIGNIFICANT CHANGE UP (ref 42.2–75.2)
NITRITE UR-MCNC: NEGATIVE — SIGNIFICANT CHANGE UP
NRBC # BLD: 0 /100 WBCS — SIGNIFICANT CHANGE UP (ref 0–0)
PH UR: 6 — SIGNIFICANT CHANGE UP (ref 5–8)
PLATELET # BLD AUTO: 269 K/UL — SIGNIFICANT CHANGE UP (ref 130–400)
PMV BLD: 9.8 FL — SIGNIFICANT CHANGE UP (ref 7.4–10.4)
POTASSIUM SERPL-MCNC: 4 MMOL/L — SIGNIFICANT CHANGE UP (ref 3.5–5)
POTASSIUM SERPL-SCNC: 4 MMOL/L — SIGNIFICANT CHANGE UP (ref 3.5–5)
PROT SERPL-MCNC: 7.2 G/DL — SIGNIFICANT CHANGE UP (ref 6–8)
PROT UR-MCNC: SIGNIFICANT CHANGE UP MG/DL
RBC # BLD: 5.19 M/UL — SIGNIFICANT CHANGE UP (ref 4.7–6.1)
RBC # FLD: 12.4 % — SIGNIFICANT CHANGE UP (ref 11.5–14.5)
SODIUM SERPL-SCNC: 139 MMOL/L — SIGNIFICANT CHANGE UP (ref 135–146)
SP GR SPEC: >1.03 — HIGH (ref 1–1.03)
UROBILINOGEN FLD QL: 1 MG/DL — SIGNIFICANT CHANGE UP (ref 0.2–1)
WBC # BLD: 5.75 K/UL — SIGNIFICANT CHANGE UP (ref 4.8–10.8)
WBC # FLD AUTO: 5.75 K/UL — SIGNIFICANT CHANGE UP (ref 4.8–10.8)

## 2024-06-14 PROCEDURE — 93005 ELECTROCARDIOGRAM TRACING: CPT

## 2024-06-14 PROCEDURE — 99285 EMERGENCY DEPT VISIT HI MDM: CPT

## 2024-06-14 PROCEDURE — 71046 X-RAY EXAM CHEST 2 VIEWS: CPT | Mod: 26

## 2024-06-14 PROCEDURE — 85025 COMPLETE CBC W/AUTO DIFF WBC: CPT

## 2024-06-14 PROCEDURE — 74177 CT ABD & PELVIS W/CONTRAST: CPT | Mod: 26,MC

## 2024-06-14 PROCEDURE — 81003 URINALYSIS AUTO W/O SCOPE: CPT

## 2024-06-14 PROCEDURE — 83605 ASSAY OF LACTIC ACID: CPT

## 2024-06-14 PROCEDURE — 80053 COMPREHEN METABOLIC PANEL: CPT

## 2024-06-14 PROCEDURE — 99285 EMERGENCY DEPT VISIT HI MDM: CPT | Mod: 25

## 2024-06-14 PROCEDURE — 74177 CT ABD & PELVIS W/CONTRAST: CPT | Mod: MC

## 2024-06-14 PROCEDURE — 93010 ELECTROCARDIOGRAM REPORT: CPT

## 2024-06-14 PROCEDURE — 71046 X-RAY EXAM CHEST 2 VIEWS: CPT

## 2024-06-14 PROCEDURE — 83690 ASSAY OF LIPASE: CPT

## 2024-06-14 PROCEDURE — 96374 THER/PROPH/DIAG INJ IV PUSH: CPT | Mod: XU

## 2024-06-14 PROCEDURE — 36415 COLL VENOUS BLD VENIPUNCTURE: CPT

## 2024-06-14 RX ORDER — METHOCARBAMOL 500 MG/1
2 TABLET, FILM COATED ORAL
Qty: 42 | Refills: 0
Start: 2024-06-14 | End: 2024-06-20

## 2024-06-14 RX ORDER — SODIUM CHLORIDE 9 MG/ML
1000 INJECTION, SOLUTION INTRAVENOUS ONCE
Refills: 0 | Status: COMPLETED | OUTPATIENT
Start: 2024-06-14 | End: 2024-06-14

## 2024-06-14 RX ORDER — KETOROLAC TROMETHAMINE 30 MG/ML
1 SYRINGE (ML) INJECTION
Qty: 21 | Refills: 0
Start: 2024-06-14 | End: 2024-06-20

## 2024-06-14 RX ORDER — MORPHINE SULFATE 50 MG/1
4 CAPSULE, EXTENDED RELEASE ORAL ONCE
Refills: 0 | Status: DISCONTINUED | OUTPATIENT
Start: 2024-06-14 | End: 2024-06-14

## 2024-06-14 RX ADMIN — MORPHINE SULFATE 4 MILLIGRAM(S): 50 CAPSULE, EXTENDED RELEASE ORAL at 12:16

## 2024-06-14 RX ADMIN — SODIUM CHLORIDE 1000 MILLILITER(S): 9 INJECTION, SOLUTION INTRAVENOUS at 12:15

## 2024-06-14 NOTE — ED ADULT NURSE NOTE - NSFALLUNIVINTERV_ED_ALL_ED
Bed/Stretcher in lowest position, wheels locked, appropriate side rails in place/Call bell, personal items and telephone in reach/Instruct patient to call for assistance before getting out of bed/chair/stretcher/Non-slip footwear applied when patient is off stretcher/[place] to call system/Physically safe environment - no spills, clutter or unnecessary equipment/Purposeful proactive rounding/Room/bathroom lighting operational, light cord in reach

## 2024-06-14 NOTE — ED PROVIDER NOTE - OBJECTIVE STATEMENT
Patient c/o mid back and flank pain since 1 am, pain worse with movement, no SOB. H/o renal stones, no chest pain, no abd pain, no N/V

## 2024-06-14 NOTE — ED PROVIDER NOTE - PATIENT PORTAL LINK FT
You can access the FollowMyHealth Patient Portal offered by [facility] by registering at the following website: [email or web address]. By joining [employer]’s FollowMyHealth portal, you will also be able to view your health information using other applications (apps) compatible with our system.

## 2024-06-14 NOTE — ED PROVIDER NOTE - ATTENDING APP SHARED VISIT CONTRIBUTION OF CARE
34-year-old male, no past medical history, comes in complaining of left-sided back pain which started at 1 AM, worse with movement, better with rest.  No fever/chills, chest pain/shortness of breath, abdominal pain, radiation of pain, urinary symptoms, testicular pain.  Took Motrin last night.  On exam,  pt in NAD, AAOx3, head NC/AT, CN II-XII intact, lungs CTA B/L, CV S1S2 regular, abdomen soft/NT/ND/(+)BS, back (-) CVA tenderness, (+) minimal soreness to left lower back, ext (-) edema, motor 5/5x4, sensation intact.  Labs, UA, CT, x-ray done and reviewed.  Patient feels better after pain meds.  Will DC with PMD follow-up.

## 2025-03-11 NOTE — ED ADULT NURSE NOTE - NSFALLRSKHARMRISK_ED_ALL_ED
DISPLAY PLAN FREE TEXT DISPLAY PLAN FREE TEXT DISPLAY PLAN FREE TEXT no DISPLAY PLAN FREE TEXT DISPLAY PLAN FREE TEXT

## 2025-05-02 ENCOUNTER — EMERGENCY (EMERGENCY)
Facility: HOSPITAL | Age: 35
LOS: 0 days | Discharge: HOME | End: 2025-05-02
Attending: EMERGENCY MEDICINE
Payer: MEDICAID

## 2025-05-02 VITALS
OXYGEN SATURATION: 99 % | DIASTOLIC BLOOD PRESSURE: 94 MMHG | SYSTOLIC BLOOD PRESSURE: 138 MMHG | TEMPERATURE: 98 F | HEART RATE: 84 BPM | RESPIRATION RATE: 17 BRPM

## 2025-05-02 LAB
ALBUMIN SERPL ELPH-MCNC: 4.7 G/DL — SIGNIFICANT CHANGE UP (ref 3.5–5.2)
ALP SERPL-CCNC: 61 U/L — SIGNIFICANT CHANGE UP (ref 30–115)
ALT FLD-CCNC: 34 U/L — SIGNIFICANT CHANGE UP (ref 0–41)
ANION GAP SERPL CALC-SCNC: 10 MMOL/L — SIGNIFICANT CHANGE UP (ref 7–14)
APPEARANCE UR: CLEAR — SIGNIFICANT CHANGE UP
AST SERPL-CCNC: 24 U/L — SIGNIFICANT CHANGE UP (ref 0–41)
BASOPHILS # BLD AUTO: 0.04 K/UL — SIGNIFICANT CHANGE UP (ref 0–0.2)
BASOPHILS NFR BLD AUTO: 0.7 % — SIGNIFICANT CHANGE UP (ref 0–1)
BILIRUB SERPL-MCNC: <0.2 MG/DL — SIGNIFICANT CHANGE UP (ref 0.2–1.2)
BILIRUB UR-MCNC: NEGATIVE — SIGNIFICANT CHANGE UP
BUN SERPL-MCNC: 16 MG/DL — SIGNIFICANT CHANGE UP (ref 10–20)
CALCIUM SERPL-MCNC: 9.6 MG/DL — SIGNIFICANT CHANGE UP (ref 8.4–10.5)
CHLORIDE SERPL-SCNC: 102 MMOL/L — SIGNIFICANT CHANGE UP (ref 98–110)
CO2 SERPL-SCNC: 30 MMOL/L — SIGNIFICANT CHANGE UP (ref 17–32)
COLOR SPEC: YELLOW — SIGNIFICANT CHANGE UP
CREAT SERPL-MCNC: 1.1 MG/DL — SIGNIFICANT CHANGE UP (ref 0.7–1.5)
DIFF PNL FLD: NEGATIVE — SIGNIFICANT CHANGE UP
EGFR: 91 ML/MIN/1.73M2 — SIGNIFICANT CHANGE UP
EGFR: 91 ML/MIN/1.73M2 — SIGNIFICANT CHANGE UP
EOSINOPHIL # BLD AUTO: 0.15 K/UL — SIGNIFICANT CHANGE UP (ref 0–0.7)
EOSINOPHIL NFR BLD AUTO: 2.5 % — SIGNIFICANT CHANGE UP (ref 0–8)
GLUCOSE SERPL-MCNC: 94 MG/DL — SIGNIFICANT CHANGE UP (ref 70–99)
GLUCOSE UR QL: NEGATIVE MG/DL — SIGNIFICANT CHANGE UP
HCT VFR BLD CALC: 46.9 % — SIGNIFICANT CHANGE UP (ref 42–52)
HGB BLD-MCNC: 15.6 G/DL — SIGNIFICANT CHANGE UP (ref 14–18)
IMM GRANULOCYTES NFR BLD AUTO: 0.5 % — HIGH (ref 0.1–0.3)
KETONES UR-MCNC: ABNORMAL MG/DL
LACTATE SERPL-SCNC: 0.9 MMOL/L — SIGNIFICANT CHANGE UP (ref 0.7–2)
LEUKOCYTE ESTERASE UR-ACNC: NEGATIVE — SIGNIFICANT CHANGE UP
LYMPHOCYTES # BLD AUTO: 2.72 K/UL — SIGNIFICANT CHANGE UP (ref 1.2–3.4)
LYMPHOCYTES # BLD AUTO: 46.2 % — SIGNIFICANT CHANGE UP (ref 20.5–51.1)
MCHC RBC-ENTMCNC: 29.1 PG — SIGNIFICANT CHANGE UP (ref 27–31)
MCHC RBC-ENTMCNC: 33.3 G/DL — SIGNIFICANT CHANGE UP (ref 32–37)
MCV RBC AUTO: 87.3 FL — SIGNIFICANT CHANGE UP (ref 80–94)
MONOCYTES # BLD AUTO: 0.52 K/UL — SIGNIFICANT CHANGE UP (ref 0.1–0.6)
MONOCYTES NFR BLD AUTO: 8.8 % — SIGNIFICANT CHANGE UP (ref 1.7–9.3)
NEUTROPHILS # BLD AUTO: 2.43 K/UL — SIGNIFICANT CHANGE UP (ref 1.4–6.5)
NEUTROPHILS NFR BLD AUTO: 41.3 % — LOW (ref 42.2–75.2)
NITRITE UR-MCNC: NEGATIVE — SIGNIFICANT CHANGE UP
NRBC BLD AUTO-RTO: 0 /100 WBCS — SIGNIFICANT CHANGE UP (ref 0–0)
PH UR: 6 — SIGNIFICANT CHANGE UP (ref 5–8)
PLATELET # BLD AUTO: 300 K/UL — SIGNIFICANT CHANGE UP (ref 130–400)
PMV BLD: 9.6 FL — SIGNIFICANT CHANGE UP (ref 7.4–10.4)
POTASSIUM SERPL-MCNC: 4.2 MMOL/L — SIGNIFICANT CHANGE UP (ref 3.5–5)
POTASSIUM SERPL-SCNC: 4.2 MMOL/L — SIGNIFICANT CHANGE UP (ref 3.5–5)
PROT SERPL-MCNC: 7.2 G/DL — SIGNIFICANT CHANGE UP (ref 6–8)
PROT UR-MCNC: SIGNIFICANT CHANGE UP MG/DL
RBC # BLD: 5.37 M/UL — SIGNIFICANT CHANGE UP (ref 4.7–6.1)
RBC # FLD: 12.1 % — SIGNIFICANT CHANGE UP (ref 11.5–14.5)
SODIUM SERPL-SCNC: 142 MMOL/L — SIGNIFICANT CHANGE UP (ref 135–146)
SP GR SPEC: 1.03 — SIGNIFICANT CHANGE UP (ref 1–1.03)
UROBILINOGEN FLD QL: 1 MG/DL — SIGNIFICANT CHANGE UP (ref 0.2–1)
WBC # BLD: 5.89 K/UL — SIGNIFICANT CHANGE UP (ref 4.8–10.8)
WBC # FLD AUTO: 5.89 K/UL — SIGNIFICANT CHANGE UP (ref 4.8–10.8)

## 2025-05-02 PROCEDURE — 85025 COMPLETE CBC W/AUTO DIFF WBC: CPT

## 2025-05-02 PROCEDURE — 80053 COMPREHEN METABOLIC PANEL: CPT

## 2025-05-02 PROCEDURE — 99285 EMERGENCY DEPT VISIT HI MDM: CPT

## 2025-05-02 PROCEDURE — 99284 EMERGENCY DEPT VISIT MOD MDM: CPT | Mod: 25

## 2025-05-02 PROCEDURE — 74177 CT ABD & PELVIS W/CONTRAST: CPT | Mod: 26

## 2025-05-02 PROCEDURE — 74177 CT ABD & PELVIS W/CONTRAST: CPT | Mod: MC

## 2025-05-02 PROCEDURE — 83605 ASSAY OF LACTIC ACID: CPT

## 2025-05-02 PROCEDURE — 76870 US EXAM SCROTUM: CPT | Mod: 26

## 2025-05-02 PROCEDURE — 81003 URINALYSIS AUTO W/O SCOPE: CPT

## 2025-05-02 PROCEDURE — 87491 CHLMYD TRACH DNA AMP PROBE: CPT

## 2025-05-02 PROCEDURE — 36415 COLL VENOUS BLD VENIPUNCTURE: CPT

## 2025-05-02 PROCEDURE — 87591 N.GONORRHOEAE DNA AMP PROB: CPT

## 2025-05-02 PROCEDURE — 36000 PLACE NEEDLE IN VEIN: CPT | Mod: XU

## 2025-05-02 PROCEDURE — 76870 US EXAM SCROTUM: CPT

## 2025-05-02 NOTE — ED PROVIDER NOTE - PATIENT PORTAL LINK FT
You can access the FollowMyHealth Patient Portal offered by [facility] by registering at the following website: [email or web address]. By joining [employer]’s FollowMyHealth portal, you will also be able to view your health information using other applications (apps) compatible with our system.

## 2025-05-02 NOTE — ED PROVIDER NOTE - OBJECTIVE STATEMENT
patient c/o testicular and lower abd pain for 1 mos, intermittent, worsening today, Feels like electrical shocks, no dysuria, no discharge

## 2025-05-02 NOTE — ED PROVIDER NOTE - CLINICAL SUMMARY MEDICAL DECISION MAKING FREE TEXT BOX
Patient is a 34-year-old male presents for evaluation of lower abdominal suprapubic pain onset for 1 month intermittent today states that it feels sharp in nature at some point psychological shocks denies any dysuria hesitancy or frequency denies any radiation to the back denies any rashes denies any trauma    On physical exam patient is well-appearing normocephalic/atraumatic pupils equal round reactive light accommodation extraocular muscles intact oropharynx clear chest clear to auscultation bilaterally abdomen soft nontender nondistended no guarding no rebound  no pain to palpation of the testicles no rashes noted no signs of infection no redness no discharge no lesions noted no CVA tenderness    Assessment plan patient presents for evaluation of testicular pain as well as bilateral lower quadrant abdominal pain onset 1 month no signs of trauma we obtained ultrasound which is not consistent with any testicular pathology specifically not consistent with testicular torsion in addition to CT abdomen pelvis which is negative urinalysis negative we obtained labs no large elevation of white blood cell count no electrolyte abnormalities noted patient improved here with treatment at this point CT ultrasound labs negative I will discharge follow-up to PMD/urology

## 2025-05-02 NOTE — ED PROVIDER NOTE - ATTENDING APP SHARED VISIT CONTRIBUTION OF CARE
I have personally performed a history and physical exam on this patient and personally directed the management of the patient. Patient is a 34-year-old male presents for evaluation of lower abdominal suprapubic pain onset for 1 month intermittent today states that it feels sharp in nature at some point psychological shocks denies any dysuria hesitancy or frequency denies any radiation to the back denies any rashes denies any trauma    On physical exam patient is well-appearing normocephalic/atraumatic pupils equal round reactive light accommodation extraocular muscles intact oropharynx clear chest clear to auscultation bilaterally abdomen soft nontender nondistended no guarding no rebound  no pain to palpation of the testicles no rashes noted no signs of infection no redness no discharge no lesions noted no CVA tenderness    Assessment plan patient presents for evaluation of testicular pain as well as bilateral lower quadrant abdominal pain onset 1 month no signs of trauma we obtained ultrasound which is not consistent with any testicular pathology specifically not consistent with testicular torsion in addition to CT abdomen pelvis which is negative urinalysis negative we obtained labs no large elevation of white blood cell count no electrolyte abnormalities noted patient improved here with treatment at this point CT ultrasound labs negative I will discharge follow-up to PMD/urology

## 2025-05-02 NOTE — ED PROVIDER NOTE - NSFOLLOWUPINSTRUCTIONS_ED_ALL_ED_FT
follow up with urology for further eval     Our Emergency Department Referral Coordinators will be reaching out to you in the next 24-48 hours from 9:00am to 5:00pm to schedule a follow up appointment. Please expect a phone call from the hospital in that time frame. If you do not receive a call or if you have any questions or concerns, you can reach them at   [phone].

## 2025-05-03 LAB — SPECIMEN SOURCE: SIGNIFICANT CHANGE UP

## 2025-05-05 LAB
C TRACH RRNA SPEC QL NAA+PROBE: SIGNIFICANT CHANGE UP
N GONORRHOEA RRNA SPEC QL NAA+PROBE: SIGNIFICANT CHANGE UP

## 2025-06-20 ENCOUNTER — APPOINTMENT (OUTPATIENT)
Dept: UROLOGY | Facility: CLINIC | Age: 35
End: 2025-06-20